# Patient Record
Sex: FEMALE | Race: WHITE | NOT HISPANIC OR LATINO | URBAN - METROPOLITAN AREA
[De-identification: names, ages, dates, MRNs, and addresses within clinical notes are randomized per-mention and may not be internally consistent; named-entity substitution may affect disease eponyms.]

---

## 2020-08-18 ENCOUNTER — AMBULATORY - HEALTHEAST (OUTPATIENT)
Dept: CARDIOLOGY | Facility: CLINIC | Age: 70
End: 2020-08-18

## 2020-08-24 ENCOUNTER — COMMUNICATION - HEALTHEAST (OUTPATIENT)
Dept: CARDIOLOGY | Facility: CLINIC | Age: 70
End: 2020-08-24

## 2020-08-25 ENCOUNTER — OFFICE VISIT - HEALTHEAST (OUTPATIENT)
Dept: CARDIOLOGY | Facility: CLINIC | Age: 70
End: 2020-08-25

## 2020-08-25 DIAGNOSIS — R73.03 PREDIABETES: ICD-10-CM

## 2020-08-25 DIAGNOSIS — E66.09 CLASS 1 OBESITY DUE TO EXCESS CALORIES WITHOUT SERIOUS COMORBIDITY IN ADULT, UNSPECIFIED BMI: ICD-10-CM

## 2020-08-25 DIAGNOSIS — E78.2 MIXED HYPERLIPIDEMIA: ICD-10-CM

## 2020-08-25 DIAGNOSIS — E66.811 CLASS 1 OBESITY DUE TO EXCESS CALORIES WITHOUT SERIOUS COMORBIDITY IN ADULT, UNSPECIFIED BMI: ICD-10-CM

## 2020-08-25 DIAGNOSIS — I49.1 PAC (PREMATURE ATRIAL CONTRACTION): ICD-10-CM

## 2020-08-25 DIAGNOSIS — R00.2 PALPITATIONS: ICD-10-CM

## 2020-08-25 ASSESSMENT — MIFFLIN-ST. JEOR: SCORE: 1613.58

## 2020-08-27 LAB
ATRIAL RATE - MUSE: 76 BPM
DIASTOLIC BLOOD PRESSURE - MUSE: NORMAL
INTERPRETATION ECG - MUSE: NORMAL
P AXIS - MUSE: 51 DEGREES
PR INTERVAL - MUSE: 154 MS
QRS DURATION - MUSE: 82 MS
QT - MUSE: 406 MS
QTC - MUSE: 456 MS
R AXIS - MUSE: 22 DEGREES
SYSTOLIC BLOOD PRESSURE - MUSE: NORMAL
T AXIS - MUSE: 42 DEGREES
VENTRICULAR RATE- MUSE: 76 BPM

## 2020-09-02 ENCOUNTER — HOSPITAL ENCOUNTER (OUTPATIENT)
Dept: CARDIOLOGY | Facility: CLINIC | Age: 70
Discharge: HOME | End: 2020-09-02
Attending: INTERNAL MEDICINE

## 2020-09-02 DIAGNOSIS — R00.2 PALPITATIONS: ICD-10-CM

## 2020-09-02 DIAGNOSIS — I49.1 PAC (PREMATURE ATRIAL CONTRACTION): ICD-10-CM

## 2021-06-04 VITALS
BODY MASS INDEX: 33.77 KG/M2 | HEIGHT: 69 IN | RESPIRATION RATE: 16 BRPM | HEART RATE: 80 BPM | DIASTOLIC BLOOD PRESSURE: 60 MMHG | WEIGHT: 228 LBS | SYSTOLIC BLOOD PRESSURE: 122 MMHG

## 2021-06-10 NOTE — PATIENT INSTRUCTIONS - HE
1. Limiting caffeine and alcohol may help to decrease the frequency of your extra beats  2. Correction of any sleep disorder may also help decrease the frequency of your extra beats and palpitations.  3. A 24-hour Holter monitor will help us to exclude the presence of a more concerning arrhythmia such as atrial fibrillation.  4. Continue your diet and exercise regimen, from your description these would appear to be good habits for life.

## 2021-06-10 NOTE — TELEPHONE ENCOUNTER
Wellness Screening Tool  Symptom Screening:  Do you have one of the following NEW symptoms:    Fever (subjective or >100.0)?  No    A new cough?  No    Shortness of breath?  No     Chills? No     New loss of taste or smell? No     Generalized body aches? No     New persistent headache? No     New sore throat? No     Nausea, vomiting, or diarrhea?  No    Within the past 3 weeks, have you been exposed to someone with a known positive illness below:    COVID-19 (known or suspected)?  No    Chicken pox?  No    Mealses?  No    Pertussis?  No    Patient notified of visitor policy- They may have one person accompany them to their appointment, but they will need to wear a mask and will be screened upon arrival for symptoms: Yes  Pt informed to wear a mask: Yes  Pt notified if they develop any symptoms listed above, prior to their appointment, they are to call the clinic directly at [phone] for further instructions.  Yes  Patient's appointment status: Patient will be seen in clinic as scheduled on 8/25/20

## 2021-06-16 PROBLEM — E78.2 MIXED HYPERLIPIDEMIA: Status: ACTIVE | Noted: 2020-08-25

## 2021-06-16 PROBLEM — R00.2 PALPITATIONS: Status: ACTIVE | Noted: 2020-08-25

## 2021-06-16 PROBLEM — R73.03 PREDIABETES: Status: ACTIVE | Noted: 2020-08-25

## 2021-06-16 PROBLEM — E66.811 CLASS 1 OBESITY IN ADULT: Status: ACTIVE | Noted: 2020-08-25

## 2021-06-29 NOTE — PROGRESS NOTES
Progress Notes by Migel Carty MD at 8/25/2020  9:10 AM     Author: Migel Carty MD Service: -- Author Type: Physician    Filed: 8/25/2020 10:38 AM Encounter Date: 8/25/2020 Status: Signed    : Migel Carty MD (Physician)         CARDIOLOGY CLINIC CONSULT NOTE     Assessment/Plan:   1.  Palpitations with frequent PACs, some with aberrant conduction.  Discussed the possible relationship of this to alcohol, sleep deprivation, stress, caffeine.  She has had some improvement in them with reduction in caffeine.  We will check a 24-hour Holter monitor to exclude more ominous arrhythmia such as atrial fibrillation that may warrant intervention with anticoagulant therapy.  2.  Disturbed sleep with history of mild obstructive sleep apnea.    Follow-up for abnormalities of significance on Holter monitoring     History of Present Illness:     It is my pleasure to see Joseline Irsael at the [facility] clinic for evaluation of palpitations.    Joseline Israel is a 70 y.o. female with a past medical history of obesity, hyperlipidemia, prediabetes.  She reports that she had what was thought to be a TIA in 2003 when she fell after standing quickly upon awakening and struck her face, but no sustained neurologic deficits.  She has no history of coronary artery disease, and a negative stress test several years ago.  She saw [facility] cardiologist fairly regularly but feels badly that she did not follow through with a recommended monitor for palpitations she was experiencing a year or 2 ago.  She reports that she has limited her caffeine intake and improved her palpitations though she still has occasional dizzy spells, including 2 in the last month.  She notes that her pulse is constantly irregular, which she is only aware of under stress.  She reports she has lost 20 pounds in the last 5 months with a low carbohydrate diet.    She has had no syncope or recent falls.  She has had no  sustained palpitations.  Resents because she wishes to take better care of her heart.    Past Medical History:     Patient Active Problem List   Diagnosis   ? Palpitations   ? Prediabetes   ? Class 1 obesity in adult   ? Mixed hyperlipidemia       Past Surgical History:   No past surgical history on file.    Family History:   She reports 2 brothers had bypass surgery in their 50s, sister had bypass surgery in her 70s.  Father  of coronary disease though she does not know details as he was estranged.    Social History:    reports that she quit smoking about 26 years ago. She has never used smokeless tobacco.    Exercise: Walks 3 miles 4 days a week, some light weight lifting    Sleep: Poorly restorative with daytime sleepiness, trouble falling asleep and nocturia.  No paroxysmal nocturnal dyspnea.  No daytime naps.  2 previous sleep apnea evaluations reportedly showed mild obstructive sleep apnea, no treatment    Meds:     Current Outpatient Medications   Medication Sig Dispense Refill   ? ascorbic acid, vitamin C, (VITAMIN C) 1000 MG tablet Take 1,000 mg by mouth daily.     ? aspirin 81 MG EC tablet Take 81 mg by mouth daily.     ? atorvastatin (LIPITOR) 40 MG tablet Take 40 mg by mouth daily.     ? CALCIUM CARBONATE-VITAMIN D3 ORAL Take 1 tablet by mouth daily.     ? cholecalciferol, vitamin D3, 1,000 unit (25 mcg) tablet Take 3,000 Units by mouth daily.     ? EPINEPHrine (EPIPEN/ADRENACLICK/AUVI-Q) 0.3 mg/0.3 mL injection Inject 0.3 mg into the shoulder, thigh, or buttocks.     ? lactobacillus comb no.10 20 billion cell cap Take 1 capsule by mouth daily.     ? metFORMIN (GLUCOPHAGE) 500 MG tablet Take 500 mg by mouth daily.     ? multivit-min/folic acid/biotin (HAIR,SKIN AND NAILS,FA-BIOTIN, ORAL) Take 3 tablets by mouth daily.     ? MULTIVITAMIN ORAL Take 1 tablet by mouth daily.     ? vitamin B complex-folic acid (B COMPLEX 1, WITH FOLIC ACID,) 0.4 mg Tab Take 1 tablet by mouth daily.       No current  "facility-administered medications for this visit.        Allergies:   Sulfa (sulfonamide antibiotics) and Tramadol    Review of Systems:     General: WNL  Eyes: WNL  Ears/Nose/Throat: WNL  Lungs: Snoring  Heart: Shortness of Breath with activity, Irregular Heartbeat  Stomach: WNL  Bladder: WNL  Muscle/Joints: WNL  Skin: WNL  Nervous System: Dizziness  Mental Health: WNL     Blood: Easy Bruising        Objective:      Physical Exam  (!) 228 lb (103.4 kg)  5' 9\" (1.753 m)  Body mass index is 33.67 kg/m .  /60 (Patient Site: Left Arm, Patient Position: Sitting, Cuff Size: Adult Large)   Pulse 80   Resp 16   Ht 5' 9\" (1.753 m)   Wt (!) 228 lb (103.4 kg)   BMI 33.67 kg/m          General Appearance : Awake, Alert, No acute distress  HEENT: No Scleral icterus; the mucous membranes were pink and moist.  Conjunctivae not injected  Neck:  No cervical bruits, jugular venous distention, or thyromegaly   Chest: The spine was straight. Chest wall symmetric  Lungs: Respirations unlabored; the lungs are clear to auscultation.  No wheezing  Cardiovascular:   Normal point of maximal impulse.  Auscultation reveals normal first and second heart sounds with no murmurs, rubs, or gallops.  Frequent extrasystoles are noted. carotid, radial, and dorsalis pedal pulses are intact and symmetric.  Abdomen: Obese.  No organomegaly, masses, bruits, or tenderness. Bowels sounds are present  Extremities: No edema  Skin: No xanthelasma. Warm, Dry.  Musculoskeletal: No tenderness.  Neurologic: Alert and oriented ×3. Speech is fluent.      EKG (personally reviewed):  Sinus rhythm with frequent PACs, some with aberrant conduction.    Cardiac Imaging Studies:  Echo at health [employer] 2017 reported 8 minutes and 30 seconds on [place] protocol with no evidence of stress-induced ischemia.    Lab Review   No results found for: NA, K, CL, CO2, BUN, CREATININE, GLUCOSE, CALCIUM  No results found for: WBC, HGB, HCT, MCV, PLT  No results found for: " CHOL, TRIG, HDL, LDLCALC  No results found for: TROPONINI  No results found for: BNP  No results found for: TSH    Migel Carty MD [facility]      This note created using Dragon voice recognition software. Sound alike errors may have escaped editing.